# Patient Record
Sex: MALE | Race: OTHER | HISPANIC OR LATINO | URBAN - METROPOLITAN AREA
[De-identification: names, ages, dates, MRNs, and addresses within clinical notes are randomized per-mention and may not be internally consistent; named-entity substitution may affect disease eponyms.]

---

## 2022-04-18 ENCOUNTER — EMERGENCY (EMERGENCY)
Facility: HOSPITAL | Age: 22
LOS: 1 days | Discharge: HOME | End: 2022-04-18
Attending: EMERGENCY MEDICINE | Admitting: EMERGENCY MEDICINE
Payer: MEDICAID

## 2022-04-18 VITALS
RESPIRATION RATE: 18 BRPM | DIASTOLIC BLOOD PRESSURE: 68 MMHG | HEART RATE: 93 BPM | SYSTOLIC BLOOD PRESSURE: 109 MMHG | OXYGEN SATURATION: 96 % | TEMPERATURE: 98 F

## 2022-04-18 VITALS
TEMPERATURE: 101 F | DIASTOLIC BLOOD PRESSURE: 62 MMHG | SYSTOLIC BLOOD PRESSURE: 121 MMHG | HEART RATE: 109 BPM | HEIGHT: 60 IN | OXYGEN SATURATION: 100 % | RESPIRATION RATE: 18 BRPM

## 2022-04-18 LAB
ALBUMIN SERPL ELPH-MCNC: 3.6 G/DL — SIGNIFICANT CHANGE UP (ref 3.3–5)
ALP SERPL-CCNC: 116 U/L — SIGNIFICANT CHANGE UP (ref 60–270)
ALT FLD-CCNC: 55 U/L — HIGH (ref 4–41)
ANION GAP SERPL CALC-SCNC: 12 MMOL/L — SIGNIFICANT CHANGE UP (ref 7–14)
ANISOCYTOSIS BLD QL: SLIGHT — SIGNIFICANT CHANGE UP
AST SERPL-CCNC: 25 U/L — SIGNIFICANT CHANGE UP (ref 4–40)
BASOPHILS # BLD AUTO: 0 K/UL — SIGNIFICANT CHANGE UP (ref 0–0.2)
BASOPHILS NFR BLD AUTO: 0 % — SIGNIFICANT CHANGE UP (ref 0–2)
BILIRUB SERPL-MCNC: 0.4 MG/DL — SIGNIFICANT CHANGE UP (ref 0.2–1.2)
BUN SERPL-MCNC: 10 MG/DL — SIGNIFICANT CHANGE UP (ref 7–23)
CALCIUM SERPL-MCNC: 8 MG/DL — LOW (ref 8.4–10.5)
CHLORIDE SERPL-SCNC: 103 MMOL/L — SIGNIFICANT CHANGE UP (ref 98–107)
CO2 SERPL-SCNC: 19 MMOL/L — LOW (ref 22–31)
CREAT SERPL-MCNC: 0.79 MG/DL — SIGNIFICANT CHANGE UP (ref 0.5–1.3)
EGFR: 131 ML/MIN/1.73M2 — SIGNIFICANT CHANGE UP
EOSINOPHIL # BLD AUTO: 0 K/UL — SIGNIFICANT CHANGE UP (ref 0–0.5)
EOSINOPHIL NFR BLD AUTO: 0 % — SIGNIFICANT CHANGE UP (ref 0–6)
GIANT PLATELETS BLD QL SMEAR: PRESENT — SIGNIFICANT CHANGE UP
GLUCOSE SERPL-MCNC: 102 MG/DL — HIGH (ref 70–99)
HCT VFR BLD CALC: 38.4 % — LOW (ref 39–50)
HETEROPH AB TITR SER AGGL: POSITIVE
HGB BLD-MCNC: 13.2 G/DL — SIGNIFICANT CHANGE UP (ref 13–17)
IANC: 4.08 K/UL — SIGNIFICANT CHANGE UP (ref 1.8–7.4)
LACTATE BLDV-MCNC: 1 MMOL/L — SIGNIFICANT CHANGE UP (ref 0.5–2)
LYMPHOCYTES # BLD AUTO: 0.49 K/UL — LOW (ref 1–3.3)
LYMPHOCYTES # BLD AUTO: 3.6 % — LOW (ref 13–44)
MANUAL SMEAR VERIFICATION: SIGNIFICANT CHANGE UP
MCHC RBC-ENTMCNC: 30.3 PG — SIGNIFICANT CHANGE UP (ref 27–34)
MCHC RBC-ENTMCNC: 34.4 GM/DL — SIGNIFICANT CHANGE UP (ref 32–36)
MCV RBC AUTO: 88.1 FL — SIGNIFICANT CHANGE UP (ref 80–100)
MONOCYTES # BLD AUTO: 2.21 K/UL — HIGH (ref 0–0.9)
MONOCYTES NFR BLD AUTO: 16.1 % — HIGH (ref 2–14)
NEUTROPHILS # BLD AUTO: 5.02 K/UL — SIGNIFICANT CHANGE UP (ref 1.8–7.4)
NEUTROPHILS NFR BLD AUTO: 33 % — LOW (ref 43–77)
NEUTS BAND # BLD: 3.6 % — SIGNIFICANT CHANGE UP (ref 0–6)
PLAT MORPH BLD: NORMAL — SIGNIFICANT CHANGE UP
PLATELET # BLD AUTO: 126 K/UL — LOW (ref 150–400)
PLATELET COUNT - ESTIMATE: ABNORMAL
POTASSIUM SERPL-MCNC: 3.9 MMOL/L — SIGNIFICANT CHANGE UP (ref 3.5–5.3)
POTASSIUM SERPL-SCNC: 3.9 MMOL/L — SIGNIFICANT CHANGE UP (ref 3.5–5.3)
PROT SERPL-MCNC: 6.7 G/DL — SIGNIFICANT CHANGE UP (ref 6–8.3)
RBC # BLD: 4.36 M/UL — SIGNIFICANT CHANGE UP (ref 4.2–5.8)
RBC # FLD: 13.2 % — SIGNIFICANT CHANGE UP (ref 10.3–14.5)
RBC BLD AUTO: NORMAL — SIGNIFICANT CHANGE UP
SMUDGE CELLS # BLD: PRESENT — SIGNIFICANT CHANGE UP
SODIUM SERPL-SCNC: 134 MMOL/L — LOW (ref 135–145)
SPHEROCYTES BLD QL SMEAR: SLIGHT — SIGNIFICANT CHANGE UP
VARIANT LYMPHS # BLD: 43.7 % — HIGH (ref 0–6)
WBC # BLD: 13.72 K/UL — HIGH (ref 3.8–10.5)
WBC # FLD AUTO: 13.72 K/UL — HIGH (ref 3.8–10.5)

## 2022-04-18 PROCEDURE — 70491 CT SOFT TISSUE NECK W/DYE: CPT | Mod: 26,MA

## 2022-04-18 PROCEDURE — 99285 EMERGENCY DEPT VISIT HI MDM: CPT

## 2022-04-18 RX ORDER — SODIUM CHLORIDE 9 MG/ML
1000 INJECTION INTRAMUSCULAR; INTRAVENOUS; SUBCUTANEOUS ONCE
Refills: 0 | Status: COMPLETED | OUTPATIENT
Start: 2022-04-18 | End: 2022-04-18

## 2022-04-18 RX ORDER — KETOROLAC TROMETHAMINE 30 MG/ML
15 SYRINGE (ML) INJECTION ONCE
Refills: 0 | Status: DISCONTINUED | OUTPATIENT
Start: 2022-04-18 | End: 2022-04-18

## 2022-04-18 RX ORDER — ACETAMINOPHEN 500 MG
650 TABLET ORAL ONCE
Refills: 0 | Status: COMPLETED | OUTPATIENT
Start: 2022-04-18 | End: 2022-04-18

## 2022-04-18 RX ORDER — AMPICILLIN SODIUM AND SULBACTAM SODIUM 250; 125 MG/ML; MG/ML
3 INJECTION, POWDER, FOR SUSPENSION INTRAMUSCULAR; INTRAVENOUS ONCE
Refills: 0 | Status: COMPLETED | OUTPATIENT
Start: 2022-04-18 | End: 2022-04-18

## 2022-04-18 RX ORDER — DEXAMETHASONE 0.5 MG/5ML
6 ELIXIR ORAL ONCE
Refills: 0 | Status: COMPLETED | OUTPATIENT
Start: 2022-04-18 | End: 2022-04-18

## 2022-04-18 RX ORDER — IBUPROFEN 200 MG
1 TABLET ORAL
Qty: 20 | Refills: 0
Start: 2022-04-18 | End: 2022-04-22

## 2022-04-18 RX ADMIN — Medication 15 MILLIGRAM(S): at 10:34

## 2022-04-18 RX ADMIN — SODIUM CHLORIDE 1000 MILLILITER(S): 9 INJECTION INTRAMUSCULAR; INTRAVENOUS; SUBCUTANEOUS at 10:34

## 2022-04-18 RX ADMIN — Medication 650 MILLIGRAM(S): at 10:33

## 2022-04-18 RX ADMIN — Medication 6 MILLIGRAM(S): at 10:33

## 2022-04-18 RX ADMIN — AMPICILLIN SODIUM AND SULBACTAM SODIUM 200 GRAM(S): 250; 125 INJECTION, POWDER, FOR SUSPENSION INTRAMUSCULAR; INTRAVENOUS at 11:48

## 2022-04-18 NOTE — ED PROVIDER NOTE - NSFOLLOWUPINSTRUCTIONS_ED_ALL_ED_FT
Peritonsillar Abscess    An open mouth showing the tonsils.   A peritonsillar abscess is a collection of pus in the back of the throat, behind the tonsils. It usually occurs when an infection of the throat or tonsils (tonsillitis) spreads into the tissues around the tonsils.      What are the causes?    The infection that leads to a peritonsillar abscess is usually caused by streptococcal bacteria.      What increases the risk?    You are more likely to develop this condition if:  •You have recently been diagnosed with an infection in your mouth or throat.      •You smoke.      •You have gum disease or gingivitis (periodontal disease).        What are the signs or symptoms?    Symptoms of this condition include:  •A sore throat, often with pain on just one side.      •Swollen, tender glands (lymph nodes) in the neck.      •Difficulty swallowing.      •Difficulty opening your mouth.      •Fever.      •Chills.      •Drooling because of difficulty swallowing saliva.      •Headache.      •Changes in how your voice sounds.      •Bad breath.        How is this diagnosed?    This condition may be diagnosed based on:  •Your symptoms and medical history.      •A physical exam.      •Imaging tests, such as ultrasound or CT scan.      •Testing a pus sample from the abscess. Your health care provider may collect a pus sample by swabbing the back of your throat or by removing some pus with a syringe and needle (needle aspiration).        How is this treated?    Treatment usually involves draining the pus from the abscess. This may be done through needle aspiration or by making an incision in the abscess and draining the fluid. You will also likely need to take antibiotic medicine.      Follow these instructions at home:    Medicines     •Take over-the-counter and prescription medicines only as told by your health care provider.      •If you were prescribed an antibiotic, take it as told by your health care provider. Do not stop taking the antibiotic even if your condition improves.        Eating and drinking   A diet of soft foods, including apple sauce, yogurt, and smoothie.    •Drink enough fluid to keep your urine pale yellow.      •While your throat is sore, try only drinking liquids or eating only soft-textured foods such as yogurt and ice cream.      General instructions     •Rest as much as possible and get plenty of sleep.      •Return to your normal activities as told by your health care provider. Ask your health care provider what activities are safe for you.    •If your abscess was drained, gargle with a salt-water mixture 3–4 times a day or as needed.  •To make a salt-water mixture, completely dissolve ½–1 tsp of salt in 1 cup of warm water.      •Do not swallow this mixture.        • Do not use any products that contain nicotine or tobacco, such as cigarettes and e-cigarettes. If you need help quitting, ask your health care provider.      •Keep all follow-up visits as told by your health care provider. This is important.        Contact a health care provider if you have:    •More pain, swelling, redness, or pus in your throat.      •A headache.      •Lack of energy (lethargy).      •A general feeling of illness (malaise).      •A fever.      •Dizziness.      •Trouble swallowing.      •Trouble eating.    •Signs of dehydration, such as:  •Light-headedness when standing.      •Urinating less than usual.      •A fast heart rate.      •Dry mouth.          Get help right away if you:    •Have trouble talking.      •Have trouble breathing, or it is easier for you to breathe when you lean forward.      •Cough up blood or vomit blood after treatment.      •Have severe throat pain that does not get better with medicine.        Summary    •A peritonsillar abscess is a collection of pus in the back of the throat. It usually occurs when an infection of the throat or tonsils spreads.      •Symptoms include a sore throat, difficulty swallowing, fever, chills, and occasional drooling.      •This condition is treated by draining the abscess and taking antibiotic medicine.      •Call your health care provider if you have trouble swallowing or eating after treatment.      •Get help right away if you vomit blood or cough up blood after you receive treatment.      This information is not intended to replace advice given to you by your health care provider. Make sure you discuss any questions you have with your health care provider.      Infectious Mononucleosis      Infectious mononucleosis is a viral infection. It is often referred to as "mono." It causes symptoms that affect various areas of the body, including the throat, upper air passages, and lymph glands. The liver or spleen may also be affected.    The virus spreads from person to person (is contagious) through close contact. The illness is usually not serious, and it typically goes away in 2–4 weeks without treatment. In rare cases, symptoms can be more severe and last longer, sometimes up to several months.      What are the causes?    This condition is commonly caused by the Jada–Barr virus (EBV). This virus spreads through:•Having contact with an infected person's saliva or other bodily fluids, often through:  •Kissing.      •Sex.      •Coughing.      •Sneezing.        •Sharing utensils or drinking glasses with an infected person.      •Receiving blood from an infected donor (blood transfusion).      •Receiving an organ from an infected donor (organ transplant).        What increases the risk?    You are more likely to develop this condition if:  •You are 15–24 years old.        What are the signs or symptoms?  Side view of the head and neck showing normal and swollen glands.   Symptoms of this condition usually appear 4–6 weeks after infection. Symptoms may develop slowly and occur at different times. Common symptoms include:    Mild symptoms of this condition include:  •Headache.      •Muscle aches.      •Swollen glands.      •Poor appetite.      Moderate symptoms of this condition include:  •Sore throat.      •Fever.      •Rash.      •Nausea      Other symptoms may include:  •Extreme fatigue.      •Enlarged liver or spleen.      •Abdominal pain.        How is this diagnosed?    This condition may be diagnosed based on:  •Your medical history.      •Your symptoms.      •A physical exam.      •Blood tests to confirm the diagnosis.        How is this treated?    There is no cure for this condition. Infectious mononucleosis usually goes away on its own with time. Treatment can help relieve symptoms and may include:  •Drinking plenty of fluids.      •Getting a lot of rest.      •Taking medicines to relieve pain and fever.      •Medicine (corticosteroids) to reduce swelling. This may be used if swelling in the throat causes breathing or swallowing problems.      In some severe cases, treatment may have to be given in a hospital.      Follow these instructions at home:    Medicines     •Take over-the-counter and prescription medicines only as told by your health care provider.      • Do not take the antibiotics ampicillin or amoxicillin. This may cause a rash.      •If you are under 18, do not take aspirin because of the association with Reye's syndrome.      Activity     •Rest as needed.    • Do not participate in any of the following activities until your health care provider approves:  •Exercise that requires a lot of energy.      •Heavy lifting.      •Contact sports. You may need to wait at least a month before participating in sports.        •Gradually resume your normal activities after your fever is gone, or when your health care provider tells you that you can. Be sure to rest when you get tired.        General instructions   Three cups showing dark yellow, yellow, and pale yellow urine.   •Avoid kissing or sharing utensils or drinking glasses until your health care provider tells you that you are no longer contagious.      •Drink enough fluid to keep your urine pale yellow.      • Do not drink alcohol.    •If you have a sore throat:  •Gargle with a salt-water mixture 3–4 times a day or as needed. To make a salt-water mixture, completely dissolve ½–1 tsp (3–6 g) of salt in 1 cup (237 mL) of warm water.      •Eat soft foods. Cold foods such as ice cream or ice pops can soothe a sore throat.      •Try sucking on hard candy or lozenges.        •Keep all follow-up visits. This is important.        How is this prevented?  Hands being washed with soap and water at sink.   •Avoid contact with people who are infected with mononucleosis. An infected person may not always appear ill, but he or she can still spread the virus.      •Avoid sharing utensils, drinking glasses, or toothbrushes.      •Wash your hands frequently for at least 20 seconds with soap and water. If soap and water are not available, use hand .      •Use the inside of your elbow to cover your mouth when coughing or sneezing.        Where to find more information    Centers for Disease Control and Prevention: www.cdc.gov      Contact a health care provider if:    •Your fever is not gone after 10 days.      •You have swollen lymph nodes that are not back to normal after 4 weeks.      •Your activity level is not back to normal after 2 months.      •Your skin or the white parts of your eyes turn yellow (jaundice).    •You have constipation. You may have constipation if you are having:  •Fewer bowel movements in a week than normal.      •Difficulty passing stool.      •Stools that are dry, hard, or larger than normal.          Get help right away if:    •You cannot stop vomiting.      •You are drooling or have trouble swallowing.    •You have signs of dehydration. These may include:  •Weakness.      •Pale skin.      •Sunken eyes or dry mouth.      •Rapid breathing or pulse.        •You have trouble breathing.      •You develop a stiff neck or a severe headache.      •You have severe pain in your abdomen or shoulder.      •You are confused or you have trouble with balance.      •You have jerky movements that you cannot control (seizures).      •Your nose or gums begin to bleed.      Some of these symptoms may represent a serious problem that is an emergency. Do not wait to see if the symptoms will go away. Get medical help right away. Call your local emergency services (911 in the [place] Do not drive yourself to the hospital.       Summary    •Infectious mononucleosis, or "mono," is an infection caused by the Jada–Barr virus.      •The virus that causes this condition is spread through bodily fluids. The virus is most commonly spread by kissing or sharing drinks or utensils with an infected person.      •You are more likely to develop this condition if you are 15–24 years old.      •Symptoms of this condition include sore throat, headache, fever, swollen glands, muscle aches, and extreme fatigue.      •There is no cure for this condition. Treatment can help relieve symptoms and may include drinking plenty of fluids, getting a lot of rest, or taking medicines.      This information is not intended to replace advice given to you by your health care provider. Make sure you discuss any questions you have with your health care provider.

## 2022-04-18 NOTE — ED ADULT NURSE NOTE - OBJECTIVE STATEMENT
pt received at intake rm 4 AAO x 3. pt reports throat pain, fevers, diarrhea and difficulty swallowing x 1 day. pt denies sob, chest pain, n/v. respirations even and unlabored. 20g iv placed to right ac.

## 2022-04-18 NOTE — ED PROVIDER NOTE - PHYSICAL EXAMINATION
CONSTITUTIONAL: Well-appearing; well-nourished; in no apparent distress. Non-toxic appearing.   NEURO: Alert & oriented. Gait steady without assistance. Sensory and motor functions are grossly intact.  PSYCH: Mood appropriate. Thought processes intact.   ENT: TMs are erythematous with clear liquid noted B/L, (+) light reflex. Nares patent. Posterior oropharynx is moderately erythematous with asymmetric tonsil edema and exudates, R>L. Uvula deviated ot the left. No trismus.   NECK: Supple. (+) cervical; anterior lymphadenopathy  CARD: Regular rate and rhythm, no murmurs  RESP: No accessory muscle use; breath sounds clear and equal bilaterally; no wheezes, rhonchi, or rales     ABD: Soft; non-distended; non-tender. No guarding or rebound.   MUSCULOSKELETAL/EXTREMITIES: FROM in all four extremities; no extremity edema.  SKIN: Warm; dry; no apparent lesions or exudate

## 2022-04-18 NOTE — ED PROVIDER NOTE - NS ED ATTENDING STATEMENT MOD
This was a shared visit with the FERNANDO. I reviewed and verified the documentation and independently performed the documented:

## 2022-04-18 NOTE — ED ADULT NURSE REASSESSMENT NOTE - COMFORT CARE
discharged with instructions by provider, sl removed intact, pt ambulatory with family/plan of care explained

## 2022-04-18 NOTE — ED PROVIDER NOTE - ATTENDING CONTRIBUTION TO CARE
Dr. Bose:  I performed a face to face bedside interview with patient regarding history of present illness, review of symptoms and past medical history. I completed an independent physical exam.  I have discussed patient's plan of care with PA.   I agree with note as stated above, having amended the EMR as needed to reflect my findings.   This includes HISTORY OF PRESENT ILLNESS, HIV, PAST MEDICAL/SURGICAL/FAMILY/SOCIAL HISTORY, ALLERGIES AND HOME MEDICATIONS, REVIEW OF SYSTEMS, PHYSICAL EXAM, and any PROGRESS NOTES during the time I functioned as the attending physician for this patient.    21M denies pmh presents with 3 days of progressively worsening sore throat with associated fever.  +Odynophagia, denies dysphagia.  Has been taking Tylenol/Motrin at home with some relief.  Denies known sick contacts.    Exam:  - nad  - +bilateral tonsillar swelling with exudates  - rrr  - ctab  - abd soft ntnd    A/P  - suspect strep throat/tonsillitis, eval abscess  - basic labs, CT neck  - abx, nsaids, steroids

## 2022-04-18 NOTE — ED PROVIDER NOTE - OBJECTIVE STATEMENT
22 y/o male with no PMHx presents with 3 day history of sore throat, difficulty swallowing, fever, and diarrhea x 1 day. Mother at bedside states patient has only been drinking broth and water - painful/hard to swallow solids. She notes giving him Tylenol and Motrin with minimal relief. Pt denies cough, SOB, CP, or abd pain. Pt also denies any new sexual contact/kissing. No known drug allergies. No other voiced complaints.

## 2022-04-18 NOTE — ED PROVIDER NOTE - NS ED ROS FT
ROS:  GENERAL: No fever, no chills  HEENT: As per HPI   CARDIAC: no chest pain  PULMONARY: no cough, no shortness of breath  GI: As per HPI   SKIN: no rashes  NEURO: no headache, no weakness, no dizziness  MSK: No joint pain  All other systems reviewed as negative.

## 2022-04-18 NOTE — ED PROVIDER NOTE - PATIENT PORTAL LINK FT
You can access the FollowMyHealth Patient Portal offered by [facility] by registering at the following website: [email or web address]. By joining [facility] [employer]’s FollowMyHealth portal, you will also be able to view your health information using other applications (apps) compatible with our system.

## 2022-04-18 NOTE — CONSULT NOTE ADULT - SUBJECTIVE AND OBJECTIVE BOX
HPI:  Patient is a 21y male with no significant PMH who presents to the ED with throat pain. ENT consulted for bilateral tonsillar abscess.    Patient reports sore throat x3 days associated with dysphagia and fever, Tm 101. Difficult po intake, able to take soup and liquids. CT shows bilateral intratonsillar abscess. No difficulty breathing, respiratory distress, muffled voice, restricted neck ROM.     Received decadron and unasyn in ED.      Physical Exam  T(C): 36.8 (04-18-22 @ 13:20), Max: 38.3 (04-18-22 @ 09:37)  HR: 93 (04-18-22 @ 13:20) (93 - 109)  BP: 109/68 (04-18-22 @ 13:20) (109/68 - 121/62)  RR: 18 (04-18-22 @ 13:20) (18 - 18)  SpO2: 96% (04-18-22 @ 13:20) (96% - 100%)    General: NAD, A+Ox3  No respiratory distress, stridor, or stertor  Voice quality: normal  Face:  Symmetric without masses or lesions  OU: PERRL, EOMI  AD: Pinna wnl, EAC clear, TM intact, no effusion  AS: Pinna wnl, EAC clear, TM intact, no effusion  Nose: nasal cavity clear bilaterally, inferior turbinates normal, mucosa normal without crusting or bleeding  OC/OP: tongue normal, floor of mouth wnl, no masses or lesions, +bilateral tonsillar hypertrophy with exudates  Neck: soft/flat, no LAD, neck ROM intact  CNII-XII intact      21M with EBV+ tonsillar hypertrophy with exudates, found to have bilateral intratonsillar abscess 4/18  -no acute ENT intervention  -supportive treatment  -salt water rinses tid  -dispo per ED  -ED return instructions if symptoms persistent or worsening

## 2022-04-18 NOTE — ED PROVIDER NOTE - CLINICAL SUMMARY MEDICAL DECISION MAKING FREE TEXT BOX
20 y/o male with no PMHx presents with 3 day history of sore throat, difficulty swallowing, fever, and diarrhea x 1 day. Exam reveals asymmetric tonsil edema concerning for PTA - will r/o with imaging. Dispo pending.

## 2022-04-19 LAB
CULTURE RESULTS: SIGNIFICANT CHANGE UP
SPECIMEN SOURCE: SIGNIFICANT CHANGE UP

## 2023-03-13 ENCOUNTER — APPOINTMENT (OUTPATIENT)
Dept: PEDIATRIC CARDIOLOGY | Facility: CLINIC | Age: 23
End: 2023-03-13
Payer: MEDICAID

## 2023-03-13 VITALS — DIASTOLIC BLOOD PRESSURE: 72 MMHG | SYSTOLIC BLOOD PRESSURE: 122 MMHG

## 2023-03-13 VITALS
OXYGEN SATURATION: 97 % | WEIGHT: 194.01 LBS | DIASTOLIC BLOOD PRESSURE: 74 MMHG | HEART RATE: 81 BPM | SYSTOLIC BLOOD PRESSURE: 111 MMHG | BODY MASS INDEX: 28.73 KG/M2 | HEIGHT: 69.09 IN

## 2023-03-13 DIAGNOSIS — Z82.49 FAMILY HISTORY OF ISCHEMIC HEART DISEASE AND OTHER DISEASES OF THE CIRCULATORY SYSTEM: ICD-10-CM

## 2023-03-13 DIAGNOSIS — Z83.49 FAMILY HISTORY OF OTHER ENDOCRINE, NUTRITIONAL AND METABOLIC DISEASES: ICD-10-CM

## 2023-03-13 DIAGNOSIS — Z13.6 ENCOUNTER FOR SCREENING FOR CARDIOVASCULAR DISORDERS: ICD-10-CM

## 2023-03-13 DIAGNOSIS — R00.2 PALPITATIONS: ICD-10-CM

## 2023-03-13 PROCEDURE — 93000 ELECTROCARDIOGRAM COMPLETE: CPT

## 2023-03-13 PROCEDURE — 99243 OFF/OP CNSLTJ NEW/EST LOW 30: CPT

## 2023-03-13 NOTE — PHYSICAL EXAM
[General Appearance - Alert] : alert [General Appearance - In No Acute Distress] : in no acute distress [General Appearance - Well Nourished] : well nourished [General Appearance - Well Developed] : well developed [General Appearance - Well-Appearing] : well appearing [Normal Chest Appearance] : the chest was normal in appearance [Heart Rate And Rhythm] : normal heart rate and rhythm [Apical Impulse] : quiet precordium with normal apical impulse [Heart Sounds] : normal S1 and S2 [No Murmur] : no murmurs  [Heart Sounds Gallop] : no gallops [Heart Sounds Pericardial Friction Rub] : no pericardial rub [Heart Sounds Click] : no clicks [Arterial Pulses] : normal upper and lower extremity pulses with no pulse delay [Edema] : no edema [Capillary Refill Test] : normal capillary refill [Nail Clubbing] : no clubbing  or cyanosis of the fingernails [Motor Tone] : normal muscle strength and tone

## 2023-03-13 NOTE — CONSULT LETTER
[Today's Date] : [unfilled] [Name] : Name: [unfilled] [] : : ~~ [Today's Date:] : [unfilled] [Dear  ___:] : Dear Dr. [unfilled]: [Consult] : I had the pleasure of evaluating your patient, [unfilled]. My full evaluation follows. [Consult - Single Provider] : Thank you very much for allowing me to participate in the care of this patient. If you have any questions, please do not hesitate to contact me. [Sincerely,] : Sincerely, [DrLeslie  ___] : Dr. RAVI [FreeTextEntry4] : ISMA FONTANA [FreeTextEntry5] : [id number] [facility] [FreeTextEntry6] : [place] [address] [FreeTextEntry7] : [phone] [de-identified] :  \par \par Alberto Room MD, [facility]\par Associate Chief, Pediatric Cardiology\par , Pediatric Cardiac Electrophysiology\par [facility]\par [facility]\par Professor of Pediatrics\par [employer]\par \par

## 2023-03-13 NOTE — CARDIOLOGY SUMMARY
[Today's Date] : [unfilled] [Normal] : normal [FreeTextEntry1] : HR 72, NSR, no SVT.  No WPW.  Otherwise, normal for age. [FreeTextEntry2] : ECHO from [place] Sept 2022\par 1. Situs-Solitus, d-ventricular looping, normal great vessel relation (S, D, N)\par 2. Right superior and inferior vena cava drain appropriately into the right atrium. \par 3. Normal pulmonary venous connection to the L atrium. \par 4. Normal right ventricular outflow tract. Normal pulmonary valve morphology and function. Normal size main and branch pulmonary arteries and are in continuity\par 5. Normal left ventricular outflow tract. Normal aortic valve morphology and function. Normal descending aortic doppler flow. \par 6. Right and left coronary arteries visualized and appear normal\par 7. Mild mitral valve regurgitation and normal tricuspid valve morphology and function. \par 8. Normal ventricular function with SF 37%\par 9. No evidence of coarctation of aorta\par 10. Normal left aortic arch and branching. Trivial pericardial effusion.

## 2023-03-13 NOTE — END OF VISIT
[] : Resident [Time Spent: ___ minutes] : I have spent [unfilled] minutes of time on the encounter. [>50% of the face to face encounter time was spent on counseling and/or coordination of care for ___] : Greater than 50% of the face to face encounter time was spent on counseling and/or coordination of care for [unfilled] [FreeTextEntry3] : I, Dr. Romo, personally performed the evaluation and management (E/M) services for this established patient who presents today. That E/M includes conducting the examination, assessing all new/exacerbated conditions. reassessing pre-existing conditions, and establishing a new or updated plan of care. Today, the RN documented the Chief Complaint, source of information and performed med and allergy reconciliation with or without education.  The timing listed under billing is the time I personally spent reviewing material, evaluating the patient, discussing management issues, coordinating services, and making documentation.\par

## 2023-03-13 NOTE — REASON FOR VISIT
[Initial Consultation] : an initial consultation for [Mother] : mother [Patient] : patient [Palpitations] : palpitations

## 2023-03-21 ENCOUNTER — APPOINTMENT (OUTPATIENT)
Dept: PEDIATRIC CARDIOLOGY | Facility: CLINIC | Age: 23
End: 2023-03-21
Payer: MEDICAID

## 2023-03-21 PROCEDURE — 93224 XTRNL ECG REC UP TO 48 HRS: CPT

## 2023-04-10 ENCOUNTER — APPOINTMENT (OUTPATIENT)
Dept: PEDIATRIC CARDIOLOGY | Facility: CLINIC | Age: 23
End: 2023-04-10
Payer: MEDICAID

## 2023-04-10 ENCOUNTER — NON-APPOINTMENT (OUTPATIENT)
Age: 23
End: 2023-04-10

## 2023-04-10 PROCEDURE — 93224 XTRNL ECG REC UP TO 48 HRS: CPT
